# Patient Record
Sex: MALE | Race: WHITE | NOT HISPANIC OR LATINO | URBAN - METROPOLITAN AREA
[De-identification: names, ages, dates, MRNs, and addresses within clinical notes are randomized per-mention and may not be internally consistent; named-entity substitution may affect disease eponyms.]

---

## 2017-05-02 ENCOUNTER — APPOINTMENT (RX ONLY)
Dept: URBAN - METROPOLITAN AREA CLINIC 343 | Facility: CLINIC | Age: 52
Setting detail: DERMATOLOGY
End: 2017-05-02

## 2017-05-02 DIAGNOSIS — L72.0 EPIDERMAL CYST: ICD-10-CM

## 2017-05-02 DIAGNOSIS — B07.8 OTHER VIRAL WARTS: ICD-10-CM

## 2017-05-02 DIAGNOSIS — D18.0 HEMANGIOMA: ICD-10-CM

## 2017-05-02 DIAGNOSIS — L82.1 OTHER SEBORRHEIC KERATOSIS: ICD-10-CM

## 2017-05-02 DIAGNOSIS — D22 MELANOCYTIC NEVI: ICD-10-CM

## 2017-05-02 PROBLEM — J45.909 UNSPECIFIED ASTHMA, UNCOMPLICATED: Status: ACTIVE | Noted: 2017-05-02

## 2017-05-02 PROBLEM — M12.9 ARTHROPATHY, UNSPECIFIED: Status: ACTIVE | Noted: 2017-05-02

## 2017-05-02 PROBLEM — D18.01 HEMANGIOMA OF SKIN AND SUBCUTANEOUS TISSUE: Status: ACTIVE | Noted: 2017-05-02

## 2017-05-02 PROBLEM — L29.8 OTHER PRURITUS: Status: ACTIVE | Noted: 2017-05-02

## 2017-05-02 PROBLEM — D22.5 MELANOCYTIC NEVI OF TRUNK: Status: ACTIVE | Noted: 2017-05-02

## 2017-05-02 PROCEDURE — ? INTRALESIONAL KENALOG

## 2017-05-02 PROCEDURE — ? TREATMENT REGIMEN

## 2017-05-02 PROCEDURE — ? PRESCRIPTION

## 2017-05-02 PROCEDURE — 17110 DESTRUCTION B9 LES UP TO 14: CPT

## 2017-05-02 PROCEDURE — 99213 OFFICE O/P EST LOW 20 MIN: CPT | Mod: 25

## 2017-05-02 PROCEDURE — 11900 INJECT SKIN LESIONS </W 7: CPT | Mod: 59

## 2017-05-02 PROCEDURE — ? LIQUID NITROGEN

## 2017-05-02 PROCEDURE — ? COUNSELING

## 2017-05-02 RX ORDER — CEPHALEXIN 500 MG/1
1 TABLET ORAL BID
Qty: 30 | Refills: 0 | Status: ERX | COMMUNITY
Start: 2017-05-02

## 2017-05-02 RX ADMIN — CEPHALEXIN 1: 500 TABLET ORAL at 00:00

## 2017-05-02 ASSESSMENT — LOCATION ZONE DERM
LOCATION ZONE: TRUNK
LOCATION ZONE: PENIS
LOCATION ZONE: ARM

## 2017-05-02 ASSESSMENT — LOCATION SIMPLE DESCRIPTION DERM
LOCATION SIMPLE: RIGHT SHOULDER
LOCATION SIMPLE: CHEST
LOCATION SIMPLE: PENIS

## 2017-05-02 ASSESSMENT — LOCATION DETAILED DESCRIPTION DERM
LOCATION DETAILED: STERNUM
LOCATION DETAILED: RIGHT DORSAL SHAFT OF PENIS
LOCATION DETAILED: RIGHT ANTERIOR SHOULDER

## 2017-05-02 NOTE — PROCEDURE: LIQUID NITROGEN
Number Of Freeze-Thaw Cycles: 1 freeze-thaw cycle
Detail Level: Detailed
Render Post-Care Instructions In Note?: yes
Add 52 Modifier (Optional): no
Post-Care Instructions: I reviewed with the patient in detail post-care instructions. Patient is to wear sunprotection, and avoid picking at any of the treated lesions. Pt may apply Vaseline to crusted or scabbing areas.
Consent: The patient's consent was obtained including but not limited to risks of crusting, scabbing, blistering, scarring, darker or lighter pigmentary change, recurrence, incomplete removal and infection.
Medical Necessity Information: It is in your best interest to select a reason for this procedure from the list below. All of these items fulfill various CMS LCD requirements except the new and changing color options.
Medical Necessity Clause: This procedure was medically necessary because the lesions that were treated were:

## 2017-05-02 NOTE — PROCEDURE: TREATMENT REGIMEN
Detail Level: Simple
Initiate Treatment: Plan to treat with Keflex 500mg, take 1 tablet PO TID x 10 days. Also will inject with Kenalog today. Patient is agreeable to this. We will recheck in one week. Patient is to contact the office if he experiences any problems

## 2017-05-11 ENCOUNTER — APPOINTMENT (RX ONLY)
Dept: URBAN - METROPOLITAN AREA CLINIC 343 | Facility: CLINIC | Age: 52
Setting detail: DERMATOLOGY
End: 2017-05-11

## 2017-05-11 DIAGNOSIS — L72.0 EPIDERMAL CYST: ICD-10-CM | Status: RESOLVED

## 2017-05-11 PROBLEM — M12.9 ARTHROPATHY, UNSPECIFIED: Status: ACTIVE | Noted: 2017-05-11

## 2017-05-11 PROCEDURE — 99212 OFFICE O/P EST SF 10 MIN: CPT | Mod: 24

## 2017-05-11 PROCEDURE — ? TREATMENT REGIMEN

## 2017-05-11 ASSESSMENT — LOCATION SIMPLE DESCRIPTION DERM: LOCATION SIMPLE: RIGHT SHOULDER

## 2017-05-11 ASSESSMENT — LOCATION ZONE DERM: LOCATION ZONE: ARM

## 2017-05-11 ASSESSMENT — LOCATION DETAILED DESCRIPTION DERM: LOCATION DETAILED: RIGHT ANTERIOR SHOULDER

## 2017-05-11 NOTE — PROCEDURE: TREATMENT REGIMEN
Plan: Inflamed cyst has resolved with Kenalog. No further treatment is needed. Patient has already stopped taking the Keflex. He is to monitor the area and contact the office with any problems
Detail Level: Detailed

## 2018-02-05 NOTE — PATIENT DISCUSSION
Trace ERM at this time.  Recommend cataract surgery first to help with the blurry VA.  If VA is not improved, then possible consideration for PPV/MP.

## 2018-02-05 NOTE — PATIENT DISCUSSION
The patient's findings are compatible with epiretinal membrane with macular pucker. Macular pucker is usually due to previous posterior vitreous detachment but can also be due to uveitis, retinal vascular occlusion, retinal tear, retinal detachment, and idiopathic.  70% of patients with epiretinal membranes with macular pucker do not progress to the point where intervention is needed. Intervention is typically surgical. The patient can be observed carefully with retinal follow-up in a number of months. If the maculopathy progresses, surgery will be considered.

## 2018-02-21 NOTE — PATIENT DISCUSSION
Patient educated on Custom mono vision. This helps lessen the dependence on glasses, but is a compromise and glasses are often still needed for some activities including driving, binocular vision, and intermediate area activities. Patient educated there is a 10% chance of needing enhancement after surgery. Patient elects Custom Vision OD, goal of [place].

## 2018-02-21 NOTE — PATIENT DISCUSSION
Cataract surgery might improve enough that the patient is functional to the point where the epiretinal membrane surgery is not necessary.

## 2018-07-13 ENCOUNTER — APPOINTMENT (RX ONLY)
Dept: URBAN - METROPOLITAN AREA CLINIC 343 | Facility: CLINIC | Age: 53
Setting detail: DERMATOLOGY
End: 2018-07-13

## 2018-07-13 DIAGNOSIS — Z00.00 ENCOUNTER FOR GENERAL ADULT MEDICAL EXAMINATION WITHOUT ABNORMAL FINDINGS: ICD-10-CM

## 2018-07-13 DIAGNOSIS — L82.1 OTHER SEBORRHEIC KERATOSIS: ICD-10-CM

## 2018-07-13 DIAGNOSIS — L81.4 OTHER MELANIN HYPERPIGMENTATION: ICD-10-CM

## 2018-07-13 PROBLEM — Z71.1 PERSON WITH FEARED HEALTH COMPLAINT IN WHOM NO DIAGNOSIS IS MADE: Status: ACTIVE | Noted: 2018-07-13

## 2018-07-13 PROBLEM — M12.9 ARTHROPATHY, UNSPECIFIED: Status: ACTIVE | Noted: 2018-07-13

## 2018-07-13 PROCEDURE — 99213 OFFICE O/P EST LOW 20 MIN: CPT

## 2018-07-13 PROCEDURE — ? COUNSELING

## 2018-07-13 PROCEDURE — ? OTHER

## 2018-07-13 ASSESSMENT — LOCATION ZONE DERM
LOCATION ZONE: ARM
LOCATION ZONE: FACE

## 2018-07-13 ASSESSMENT — LOCATION DETAILED DESCRIPTION DERM
LOCATION DETAILED: RIGHT PROXIMAL POSTERIOR UPPER ARM
LOCATION DETAILED: RIGHT POSTERIOR SHOULDER
LOCATION DETAILED: LEFT INFERIOR CENTRAL MALAR CHEEK
LOCATION DETAILED: LEFT CENTRAL MALAR CHEEK

## 2018-07-13 ASSESSMENT — LOCATION SIMPLE DESCRIPTION DERM
LOCATION SIMPLE: RIGHT SHOULDER
LOCATION SIMPLE: LEFT CHEEK
LOCATION SIMPLE: RIGHT UPPER ARM

## 2018-07-13 NOTE — PROCEDURE: OTHER
Other (Free Text): Recommend patient follow up with his PCP or consult with an orthopedic surgeon as it could be related to a shoulder injury/ tendon issue. Patient advised this is not skin related. No returning cyst-like lesion is present today.
Note Text (......Xxx Chief Complaint.): This diagnosis correlates with the
Detail Level: Simple

## 2018-08-15 ENCOUNTER — NEW PATIENT COMPREHENSIVE (OUTPATIENT)
Dept: URBAN - METROPOLITAN AREA CLINIC 43 | Facility: CLINIC | Age: 53
End: 2018-08-15

## 2018-08-15 DIAGNOSIS — H52.03: ICD-10-CM

## 2018-08-15 DIAGNOSIS — H52.4: ICD-10-CM

## 2018-08-15 PROCEDURE — 92004 COMPRE OPH EXAM NEW PT 1/>: CPT

## 2018-08-15 PROCEDURE — 92015 DETERMINE REFRACTIVE STATE: CPT

## 2018-08-15 ASSESSMENT — VISUAL ACUITY
OD_SC: J12
OD_CC: J6
OS_BAT: 20/40
OD_BAT: 20/40
OS_SC: J12
OD_SC: 20/25
OS_CC: J6
OS_SC: 20/25-2

## 2018-08-15 ASSESSMENT — TONOMETRY
OS_IOP_MMHG: 14
OD_IOP_MMHG: 14

## 2019-02-04 NOTE — PATIENT DISCUSSION
The patient's findings are compatible with epiretinal membrane with macular pucker. Macular pucker is usually due to previous posterior vitreous detachment but can also be due to uveitis, retinal vascular occlusion, retinal tear, retinal detachment, and idiopathic. Most patients with epiretinal membranes with macular pucker do not progress to the point where intervention is needed. Intervention is typically surgical. The patient can be observed carefully with retinal follow-up in a number of months. If the maculopathy progresses, surgery will be considered. Rec observation at this point.

## 2019-03-04 NOTE — PATIENT DISCUSSION
Post op gtt instructions reviewed with patient. Routing refill request to provider for review/approval because:  Drug not on the [facility] refill protocol   Saúl Burch RN

## 2019-12-16 ENCOUNTER — APPOINTMENT (RX ONLY)
Dept: URBAN - METROPOLITAN AREA CLINIC 153 | Facility: CLINIC | Age: 54
Setting detail: DERMATOLOGY
End: 2019-12-16

## 2019-12-16 DIAGNOSIS — B07.8 OTHER VIRAL WARTS: ICD-10-CM

## 2019-12-16 DIAGNOSIS — L70.0 ACNE VULGARIS: ICD-10-CM

## 2019-12-16 DIAGNOSIS — L72.8 OTHER FOLLICULAR CYSTS OF THE SKIN AND SUBCUTANEOUS TISSUE: ICD-10-CM

## 2019-12-16 PROBLEM — D48.5 NEOPLASM OF UNCERTAIN BEHAVIOR OF SKIN: Status: ACTIVE | Noted: 2019-12-16

## 2019-12-16 PROCEDURE — 11102 TANGNTL BX SKIN SINGLE LES: CPT | Mod: 59

## 2019-12-16 PROCEDURE — ? BIOPSY BY SHAVE METHOD

## 2019-12-16 PROCEDURE — 99213 OFFICE O/P EST LOW 20 MIN: CPT | Mod: 25

## 2019-12-16 PROCEDURE — ? COUNSELING

## 2019-12-16 PROCEDURE — 17110 DESTRUCTION B9 LES UP TO 14: CPT

## 2019-12-16 PROCEDURE — ? LIQUID NITROGEN

## 2019-12-16 ASSESSMENT — LOCATION DETAILED DESCRIPTION DERM
LOCATION DETAILED: LEFT MID-UPPER BACK
LOCATION DETAILED: RIGHT DORSAL SHAFT OF PENIS
LOCATION DETAILED: RIGHT PROXIMAL PRETIBIAL REGION
LOCATION DETAILED: RIGHT NASAL SIDEWALL
LOCATION DETAILED: LEFT DORSAL SHAFT OF PENIS
LOCATION DETAILED: STERNUM

## 2019-12-16 ASSESSMENT — LOCATION SIMPLE DESCRIPTION DERM
LOCATION SIMPLE: LEFT UPPER BACK
LOCATION SIMPLE: PENIS
LOCATION SIMPLE: CHEST
LOCATION SIMPLE: RIGHT PRETIBIAL REGION
LOCATION SIMPLE: RIGHT NOSE

## 2019-12-16 ASSESSMENT — LOCATION ZONE DERM
LOCATION ZONE: LEG
LOCATION ZONE: TRUNK
LOCATION ZONE: NOSE
LOCATION ZONE: PENIS

## 2019-12-16 NOTE — PROCEDURE: COUNSELING
Detail Level: Detailed
Doxycycline Pregnancy And Lactation Text: This medication is Pregnancy Category D and not consider safe during pregnancy. It is also excreted in breast milk but is considered safe for shorter treatment courses.
Benzoyl Peroxide Pregnancy And Lactation Text: This medication is Pregnancy Category C. It is unknown if benzoyl peroxide is excreted in breast milk.
Topical Clindamycin Counseling: Patient counseled that this medication may cause skin irritation or allergic reactions.  In the event of skin irritation, the patient was advised to reduce the amount of the drug applied or use it less frequently.   The patient verbalized understanding of the proper use and possible adverse effects of clindamycin.  All of the patient's questions and concerns were addressed.
Include Pregnancy/Lactation Warning?: No
Birth Control Pills Counseling: Birth Control Pill Counseling: I discussed with the patient the potential side effects of OCPs including but not limited to increased risk of stroke, heart attack, thrombophlebitis, deep venous thrombosis, hepatic adenomas, breast changes, GI upset, headaches, and depression.  The patient verbalized understanding of the proper use and possible adverse effects of OCPs. All of the patient's questions and concerns were addressed.
Azithromycin Counseling:  I discussed with the patient the risks of azithromycin including but not limited to GI upset, allergic reaction, drug rash, diarrhea, and yeast infections.
High Dose Vitamin A Counseling: Side effects reviewed, pt to contact office should one occur.
High Dose Vitamin A Pregnancy And Lactation Text: High dose vitamin A therapy is contraindicated during pregnancy and breast feeding.
Topical Clindamycin Pregnancy And Lactation Text: This medication is Pregnancy Category B and is considered safe during pregnancy. It is unknown if it is excreted in breast milk.
Birth Control Pills Pregnancy And Lactation Text: This medication should be avoided if pregnant and for the first 30 days post-partum.
Spironolactone Pregnancy And Lactation Text: This medication can cause feminization of the male fetus and should be avoided during pregnancy. The active metabolite is also found in breast milk.
Topical Retinoid counseling:  Patient advised to apply a pea-sized amount only at bedtime and wait 30 minutes after washing their face before applying.  If too drying, patient may add a non-comedogenic moisturizer. The patient verbalized understanding of the proper use and possible adverse effects of retinoids.  All of the patient's questions and concerns were addressed.
Erythromycin Counseling:  I discussed with the patient the risks of erythromycin including but not limited to GI upset, allergic reaction, drug rash, diarrhea, increase in liver enzymes, and yeast infections.
Tetracycline Counseling: Patient counseled regarding possible photosensitivity and increased risk for sunburn.  Patient instructed to avoid sunlight, if possible.  When exposed to sunlight, patients should wear protective clothing, sunglasses, and sunscreen.  The patient was instructed to call the office immediately if the following severe adverse effects occur:  hearing changes, easy bruising/bleeding, severe headache, or vision changes.  The patient verbalized understanding of the proper use and possible adverse effects of tetracycline.  All of the patient's questions and concerns were addressed. Patient understands to avoid pregnancy while on therapy due to potential birth defects.
Dapsone Counseling: I discussed with the patient the risks of dapsone including but not limited to hemolytic anemia, agranulocytosis, rashes, methemoglobinemia, kidney failure, peripheral neuropathy, headaches, GI upset, and liver toxicity.  Patients who start dapsone require monitoring including baseline LFTs and weekly CBCs for the first month, then every month thereafter.  The patient verbalized understanding of the proper use and possible adverse effects of dapsone.  All of the patient's questions and concerns were addressed.
Azithromycin Pregnancy And Lactation Text: This medication is considered safe during pregnancy and is also secreted in breast milk.
Erythromycin Pregnancy And Lactation Text: This medication is Pregnancy Category B and is considered safe during pregnancy. It is also excreted in breast milk.
Topical Retinoid Pregnancy And Lactation Text: This medication is Pregnancy Category C. It is unknown if this medication is excreted in breast milk.
Minocycline Counseling: Patient advised regarding possible photosensitivity and discoloration of the teeth, skin, lips, tongue and gums.  Patient instructed to avoid sunlight, if possible.  When exposed to sunlight, patients should wear protective clothing, sunglasses, and sunscreen.  The patient was instructed to call the office immediately if the following severe adverse effects occur:  hearing changes, easy bruising/bleeding, severe headache, or vision changes.  The patient verbalized understanding of the proper use and possible adverse effects of minocycline.  All of the patient's questions and concerns were addressed.
Topical Sulfur Applications Counseling: Topical Sulfur Counseling: Patient counseled that this medication may cause skin irritation or allergic reactions.  In the event of skin irritation, the patient was advised to reduce the amount of the drug applied or use it less frequently.   The patient verbalized understanding of the proper use and possible adverse effects of topical sulfur application.  All of the patient's questions and concerns were addressed.
Isotretinoin Counseling: Patient should get monthly blood tests, not donate blood, not drive at night if vision affected, not share medication, and not undergo elective surgery for 6 months after tx completed. Side effects reviewed, pt to contact office should one occur.
Tazorac Counseling:  Patient advised that medication is irritating and drying.  Patient may need to apply sparingly and wash off after an hour before eventually leaving it on overnight.  The patient verbalized understanding of the proper use and possible adverse effects of tazorac.  All of the patient's questions and concerns were addressed.
Topical Sulfur Applications Pregnancy And Lactation Text: This medication is Pregnancy Category C and has an unknown safety profile during pregnancy. It is unknown if this topical medication is excreted in breast milk.
Minocycline Pregnancy And Lactation Text: This medication is Pregnancy Category D and not consider safe during pregnancy. It is also excreted in breast milk.
Bactrim Counseling:  I discussed with the patient the risks of sulfa antibiotics including but not limited to GI upset, allergic reaction, drug rash, diarrhea, dizziness, photosensitivity, and yeast infections.  Rarely, more serious reactions can occur including but not limited to aplastic anemia, agranulocytosis, methemoglobinemia, blood dyscrasias, liver or kidney failure, lung infiltrates or desquamative/blistering drug rashes.
Dapsone Pregnancy And Lactation Text: This medication is Pregnancy Category C and is not considered safe during pregnancy or breast feeding.
Bactrim Pregnancy And Lactation Text: This medication is Pregnancy Category D and is known to cause fetal risk.  It is also excreted in breast milk.
Spironolactone Counseling: Patient advised regarding risks of diarrhea, abdominal pain, hyperkalemia, birth defects (for female patients), liver toxicity and renal toxicity. The patient may need blood work to monitor liver and kidney function and potassium levels while on therapy. The patient verbalized understanding of the proper use and possible adverse effects of spironolactone.  All of the patient's questions and concerns were addressed.
Benzoyl Peroxide Counseling: Patient counseled that medicine may cause skin irritation and bleach clothing.  In the event of skin irritation, the patient was advised to reduce the amount of the drug applied or use it less frequently.   The patient verbalized understanding of the proper use and possible adverse effects of benzoyl peroxide.  All of the patient's questions and concerns were addressed.
Doxycycline Counseling:  Patient counseled regarding possible photosensitivity and increased risk for sunburn.  Patient instructed to avoid sunlight, if possible.  When exposed to sunlight, patients should wear protective clothing, sunglasses, and sunscreen.  The patient was instructed to call the office immediately if the following severe adverse effects occur:  hearing changes, easy bruising/bleeding, severe headache, or vision changes.  The patient verbalized understanding of the proper use and possible adverse effects of doxycycline.  All of the patient's questions and concerns were addressed.
Tazorac Pregnancy And Lactation Text: This medication is not safe during pregnancy. It is unknown if this medication is excreted in breast milk.
Isotretinoin Pregnancy And Lactation Text: This medication is Pregnancy Category X and is considered extremely dangerous during pregnancy. It is unknown if it is excreted in breast milk.

## 2019-12-16 NOTE — PROCEDURE: BIOPSY BY SHAVE METHOD
- Continue Procardia XL 90 and Coreg 6.25  - Follow up with PCP.      Post-Care Instructions: I reviewed with the patient in detail post-care instructions. Patient is to keep the biopsy site dry overnight, and then apply bacitracin twice daily until healed. Patient may apply hydrogen peroxide soaks to remove any crusting.

## 2020-01-14 NOTE — PATIENT DISCUSSION
Recommended excision/biopsy due to patient discomfort/suspicious appearance. Pt desires to have procedure today. Consent signed. Photo taken. Follow up PRN.

## 2020-01-14 NOTE — PROCEDURE NOTE: CLINICAL
PROCEDURE NOTE: Biopsy of Eyelid Left Upper Lid. Diagnosis: Eyelid Papilloma. Risks, benefits and alternatives discussed. Patient desires to proceed with biopsy today. A drop of proparacaine was instilled in the involved eye. Involved area was prepped using alcohol wipe and anesthetized using 1% lidocaine with epi. Lesion was excised using mini Nicole scissors and forceps and placed in formalin to be sent for histopathology. Wound was cauterized to achieve hemostasis and erythromycin ophthalmic ointment was applied. The patient tolerated the procedure well and left in good condition. Post op instructions were given to the patient. The patient understands to call us for any concerns. Sanjuana Ward

## 2020-02-10 NOTE — PATIENT DISCUSSION
Rec W/C BID and e-mycin rafa(sample given) QHS.   pt to call back if does not resolve and see and O. D.

## 2020-02-10 NOTE — PATIENT DISCUSSION
The patient's findings are compatible with epiretinal membrane with macular pucker. Macular pucker is usually due to previous posterior vitreous detachment but can also be due to uveitis, retinal vascular occlusion, retinal tear, retinal detachment, and idiopathic. Most patients with epiretinal membranes with macular pucker do not progress to the point where intervention is needed. Intervention is typically surgical. The patient can be observed carefully with retinal follow-up in a number of months. If the maculopathy progresses, surgery will be considered. Rec observation at this point.  80-85% stay same and do not require surgical intervention.

## 2021-02-08 NOTE — PATIENT DISCUSSION
Stable from last exam, has not progressed.  80-85% of patients with epiretinal membranes with macular pucker do not progress to the point where intervention is needed.  Intervention is typically surgical.

## 2023-01-13 NOTE — PROCEDURE: INTRALESIONAL KENALOG
Unless otherwise noted, the J wire was used to insert, remove, exchange, and reposition all catheters.  Concentration Of Solution Injected (Mg/Ml): 10.0

## 2023-03-21 NOTE — PATIENT DISCUSSION
reassess, goal custom abimbola vs custom -2.00 OS. Double O-Z Flap Text: The defect edges were debeveled with a #15 scalpel blade.  Given the location of the defect, shape of the defect and the proximity to free margins a Double O-Z flap was deemed most appropriate.  Using a sterile surgical marker, an appropriate transposition flap was drawn incorporating the defect and placing the expected incisions within the relaxed skin tension lines where possible. The area thus outlined was incised deep to adipose tissue with a #15 scalpel blade.  The skin margins were undermined to an appropriate distance in all directions utilizing iris scissors.

## 2023-07-15 NOTE — PROCEDURE NOTE: SURGICAL
&nbsp;
[facility]  Cardiovascular Disease  [address], [place] [id number]  Phone: [phone]  Fax: [phone]  Follow Up Time: 1-3 Days